# Patient Record
Sex: MALE | Race: WHITE | NOT HISPANIC OR LATINO | URBAN - METROPOLITAN AREA
[De-identification: names, ages, dates, MRNs, and addresses within clinical notes are randomized per-mention and may not be internally consistent; named-entity substitution may affect disease eponyms.]

---

## 2019-03-22 ENCOUNTER — OFFICE (OUTPATIENT)
Dept: URBAN - METROPOLITAN AREA CLINIC 101 | Facility: CLINIC | Age: 69
End: 2019-03-22

## 2019-03-22 ENCOUNTER — OFFICE (OUTPATIENT)
Dept: URBAN - METROPOLITAN AREA CLINIC 101 | Facility: CLINIC | Age: 69
End: 2019-03-22
Payer: COMMERCIAL

## 2019-03-22 VITALS
DIASTOLIC BLOOD PRESSURE: 63 MMHG | HEART RATE: 59 BPM | HEIGHT: 70 IN | WEIGHT: 188 LBS | TEMPERATURE: 98.8 F | SYSTOLIC BLOOD PRESSURE: 141 MMHG

## 2019-03-22 DIAGNOSIS — Z12.11 ENCOUNTER FOR SCREENING FOR MALIGNANT NEOPLASM OF COLON: ICD-10-CM

## 2019-03-22 DIAGNOSIS — Q23.1 CONGENITAL INSUFFICIENCY OF AORTIC VALVE: ICD-10-CM

## 2019-03-22 PROCEDURE — 00031: CPT

## 2019-03-22 PROCEDURE — 99202 OFFICE O/P NEW SF 15 MIN: CPT

## 2019-03-22 NOTE — SERVICEHPINOTES
SUREKHA MADDEN JR   is a   68   year old male who is being seen in consultation at the request of   QUEENIE WU   for OV prior to colonoscopy. Last colonoscopy in 2009 states he did not have any polyps and was told to return in 10 years. He denies any GI issues. BMs regular. Denies constipation, diarrhea, rectal bleeding, abdominal pain, weight loss, fever, or chills. No family hx of colon cancer or polyps.BRHe was dx with bicuspid aortic valve and aneurysm last year. He takes plavix. He follows with cardiology annually and states he has been stable. Denies chest pain, SOB, or palpitations.

## 2019-05-14 ENCOUNTER — ON CAMPUS - OUTPATIENT (OUTPATIENT)
Dept: URBAN - METROPOLITAN AREA HOSPITAL 14 | Facility: HOSPITAL | Age: 69
End: 2019-05-14
Payer: COMMERCIAL

## 2019-05-14 DIAGNOSIS — Z12.11 ENCOUNTER FOR SCREENING FOR MALIGNANT NEOPLASM OF COLON: ICD-10-CM

## 2019-05-14 DIAGNOSIS — D12.3 BENIGN NEOPLASM OF TRANSVERSE COLON: ICD-10-CM

## 2019-05-14 PROCEDURE — 45385 COLONOSCOPY W/LESION REMOVAL: CPT | Mod: PT
